# Patient Record
Sex: MALE | URBAN - METROPOLITAN AREA
[De-identification: names, ages, dates, MRNs, and addresses within clinical notes are randomized per-mention and may not be internally consistent; named-entity substitution may affect disease eponyms.]

---

## 2019-02-28 ENCOUNTER — IMPORTED ENCOUNTER (OUTPATIENT)
Dept: URBAN - METROPOLITAN AREA CLINIC 31 | Facility: CLINIC | Age: 84
End: 2019-02-28

## 2019-02-28 PROBLEM — Z96.1: Noted: 2019-02-28

## 2019-02-28 PROCEDURE — 92004 COMPRE OPH EXAM NEW PT 1/>: CPT

## 2019-02-28 PROCEDURE — 92015 DETERMINE REFRACTIVE STATE: CPT

## 2019-02-28 NOTE — PATIENT DISCUSSION
1.  Pseudophakia OU - IOLs stable. Monitor. 2. Refractive error - Change glasses. 3.  Return for an appointment in 1 year for comprehensive exam. with Dr. John Castro.

## 2022-04-02 ASSESSMENT — VISUAL ACUITY
OD_CC: 20/30+2
OS_CC: 20/70

## 2022-04-02 ASSESSMENT — TONOMETRY
OD_IOP_MMHG: 16
OS_IOP_MMHG: 17